# Patient Record
Sex: FEMALE | Race: WHITE | NOT HISPANIC OR LATINO | Employment: FULL TIME | URBAN - METROPOLITAN AREA
[De-identification: names, ages, dates, MRNs, and addresses within clinical notes are randomized per-mention and may not be internally consistent; named-entity substitution may affect disease eponyms.]

---

## 2018-06-04 ENCOUNTER — HISTORICAL (OUTPATIENT)
Dept: RADIOLOGY | Facility: HOSPITAL | Age: 33
End: 2018-06-04

## 2018-06-26 ENCOUNTER — HISTORICAL (OUTPATIENT)
Dept: RADIOLOGY | Facility: HOSPITAL | Age: 33
End: 2018-06-26

## 2018-09-18 ENCOUNTER — HISTORICAL (OUTPATIENT)
Dept: CARDIOLOGY | Facility: HOSPITAL | Age: 33
End: 2018-09-18

## 2018-12-28 ENCOUNTER — HISTORICAL (OUTPATIENT)
Dept: RADIOLOGY | Facility: HOSPITAL | Age: 33
End: 2018-12-28

## 2019-02-06 ENCOUNTER — HISTORICAL (OUTPATIENT)
Dept: LAB | Facility: HOSPITAL | Age: 34
End: 2019-02-06

## 2019-04-15 ENCOUNTER — HISTORICAL (OUTPATIENT)
Dept: LAB | Facility: HOSPITAL | Age: 34
End: 2019-04-15

## 2019-04-15 ENCOUNTER — HISTORICAL (OUTPATIENT)
Dept: ADMINISTRATIVE | Facility: HOSPITAL | Age: 34
End: 2019-04-15

## 2019-04-15 LAB
ABS NEUT (OLG): 2.3 X10(3)/MCL (ref 2.1–9.2)
ALBUMIN SERPL-MCNC: 4.5 GM/DL (ref 3.4–5)
ALBUMIN/GLOB SERPL: 2.14 {RATIO} (ref 1.5–2.5)
ALP SERPL-CCNC: 22 UNIT/L (ref 38–126)
ALT SERPL-CCNC: 9 UNIT/L (ref 7–52)
AST SERPL-CCNC: 8 UNIT/L (ref 15–37)
BILIRUB SERPL-MCNC: 0.6 MG/DL (ref 0.2–1)
BILIRUBIN DIRECT+TOT PNL SERPL-MCNC: 0.1 MG/DL (ref 0–0.5)
BILIRUBIN DIRECT+TOT PNL SERPL-MCNC: 0.5 MG/DL
BUN SERPL-MCNC: 14 MG/DL (ref 7–18)
CALCIUM SERPL-MCNC: 8.9 MG/DL (ref 8.5–10)
CHLORIDE SERPL-SCNC: 107 MMOL/L (ref 98–107)
CO2 SERPL-SCNC: 30 MMOL/L (ref 21–32)
CREAT SERPL-MCNC: 0.53 MG/DL (ref 0.6–1.3)
DEPRECATED CALCIDIOL+CALCIFEROL SERPL-MC: 19.5 NG/ML (ref 30–80)
ERYTHROCYTE [DISTWIDTH] IN BLOOD BY AUTOMATED COUNT: 12 % (ref 11.5–17)
ERYTHROCYTE [SEDIMENTATION RATE] IN BLOOD: 2 MM/HR (ref 0–20)
GLOBULIN SER-MCNC: 2.1 GM/DL (ref 1.2–3)
GLUCOSE SERPL-MCNC: 99 MG/DL (ref 74–106)
HCT VFR BLD AUTO: 32.7 % (ref 37–47)
HGB BLD-MCNC: 11.2 GM/DL (ref 12–16)
LYMPHOCYTES # BLD AUTO: 1.9 X10(3)/MCL (ref 0.6–3.4)
LYMPHOCYTES NFR BLD AUTO: 41.9 % (ref 13–40)
MCH RBC QN AUTO: 31.2 PG (ref 27–31.2)
MCHC RBC AUTO-ENTMCNC: 34 GM/DL (ref 32–36)
MCV RBC AUTO: 91 FL (ref 80–94)
MONOCYTES # BLD AUTO: 0.3 X10(3)/MCL (ref 0.1–1.3)
MONOCYTES NFR BLD AUTO: 7.2 % (ref 0.1–24)
NEUTROPHILS NFR BLD AUTO: 50.9 % (ref 47–80)
PLATELET # BLD AUTO: 170 X10(3)/MCL (ref 130–400)
PMV BLD AUTO: 10.7 FL (ref 9.4–12.4)
POTASSIUM SERPL-SCNC: 4.3 MMOL/L (ref 3.5–5.1)
PROT SERPL-MCNC: 6.6 GM/DL (ref 6.4–8.2)
RBC # BLD AUTO: 3.59 X10(6)/MCL (ref 4.2–5.4)
RHEUMATOID FACT SERPL-ACNC: <10 IU/ML (ref 0–15)
SODIUM SERPL-SCNC: 140 MMOL/L (ref 136–145)
WBC # SPEC AUTO: 4.5 X10(3)/MCL (ref 4.5–11.5)

## 2022-02-01 ENCOUNTER — HISTORICAL (OUTPATIENT)
Dept: ADMINISTRATIVE | Facility: HOSPITAL | Age: 37
End: 2022-02-01

## 2022-02-01 LAB — IRON SERPL-MCNC: 128 UG/DL (ref 50–170)

## 2022-04-07 ENCOUNTER — HISTORICAL (OUTPATIENT)
Dept: ADMINISTRATIVE | Facility: HOSPITAL | Age: 37
End: 2022-04-07

## 2022-04-24 VITALS
DIASTOLIC BLOOD PRESSURE: 62 MMHG | HEIGHT: 60 IN | BODY MASS INDEX: 18.4 KG/M2 | WEIGHT: 93.69 LBS | SYSTOLIC BLOOD PRESSURE: 106 MMHG

## 2022-05-01 NOTE — HISTORICAL [facility] CERNER
This is a historical note converted from Chandana. Formatting and pictures may have been removed.  Please reference Chandana for original formatting and attached multimedia. Chief Complaint  RECHECK NOT GETTING BETTER  History of Present Illness  Patient presents today for follow-up.? She has been struggling with dysphagia and weight loss?for several months now.? She seems to be?living with this condition and have adjusted?the consistency of her foods and her overall diet?significantly since I been working with her over the last 2 months.? Her lowest weight was 85 pounds and she is now?increased to 87.96 pounds?over the last 2 months.? She states that it is easier to eat during the day?so she has been eating breakfast every morning usually 2 eggs to packet cigarettes and possibly a shake, at lunch?she has been eating?ground turkey?or beef along with?a protein shake. ?Late in the evenings she seems to have her abdominal pain and difficulty swallowing worse than it is during the day.? Medications have not been helpful?including proton pump inhibitors, Levsin, and antianxiety medications.  During her last visit we did stated if it continued?to have difficulty?that possibly would do a CT of her abdomen?and possible neurology consult. ?She has not significantly improved she is sure she is ready for this further workup.  Looking back into her old medical records she also has?anemia and decreased vitamin D likely?due to decreased p.o. intake, since that has improved recently we will repeat this laboratory analysis.  Patient has been worked up by GI,?ENT, and primary care in the past and have not found a cause?for patients condition.? She and I have been trying different medications?and, working her way through this symptomatically?with her major focus on the fact that she should not lose any more weight and actually began to gain weight?which I am happy to report is improving slowly.  Review of Systems  Constitutional:?No  fever, no weakness, no weight loss, no fatigue  Eye: ? ? ? ? ? ? ? ???No eye redness, drainage, or pain,?she does report some dry eyes and often uses over-the-counter drops  ENMT:??? ? ? ? ? ? No sore?throat pain, postnasal drainage, ?or mucus production,?just spasming?especially when she tries to swallow, this is relatively unchanged.  Respiratory:????No wheezing,?no shortness of breath  Cardiovascular:??No chest pain, no SHAW  Gastrointestinal:?No nausea, vomiting, or diarrhea.? Patient reports it is hard to explain but she does have?abdominal pain off and on again worse in the evenings patient points to midepigastric?and lower?abdominal area, no hematochezia or melena  Musculoskeletal:?Patient states she is not sure if this is related but she is beginning to have?multiple areas of joint pain particularly in her knees after sitting for some period of time is very difficult to stand and causing increased pain, no joint swelling  Integumentary:??? No skin rash or abnormal lesion  Neuro:??No headaches, dizziness, or weakness  Psych:?Patient states that she is anxious over this condition?but is not having episodes?of debilitating?anxiety, or?depression, no SI/HI  Endo:??No polyuria, polydipsia, or polyphagia  Heme/Lymph:??No bruising or lymphadenopathy  Physical Exam  Vitals & Measurements  T:?37.3? ?C (Oral)? HR:?80(Peripheral)? BP:?102/68?  HT:?152?cm? WT:?39.9?kg? BMI:?17.27?  General: ???? ? ? ? ? Well developed, well-nourished, in no acute distress  Eye: ? ? ? ? ? ? ? ? ? ??Clear conjunctiva, eyelids normal  HENT:??? ? ? ? ? ? ? ? No erythema, No exudate or swelling, TMs clear  Neck:??? ? ? ? ? ? ? ? ?Full range of motion, No cervical lymphadenopathy  Respiratory:??? ? ?Clear to auscultation bilaterally  Cardiovascular:?Regular rate and rhythm without murmurs, gallops or rubs  Abdomen: ? ? ? ? ?Soft, NT, ND, NABS x4, no HSM  Musculoskeletal:?No CCE  Neuro: ? ? ? ? ? ? ? ? ?No motor/sensory deficits  Integumentary:  ??No rashes or skin lesions present  LN:?WNL  Assessment/Plan  1.?Dysphagia  ?Minimal improvement during the day, as bad as ever in the evenings  Patient adjusting p.o. intake?and showing some signs of weight gain as per above.  At her last office visit we discussed if no significant improvement but a CT scan of her abdomen will be completed see below as it is been ordered.? We also discussed possible neurological condition?and referral to neurology, we will proceed with this plan pending CT results.  Further discussion patient states that she had not had an autoimmune workup in the past?will complete laboratory analysis as per below.  Ordered:  Clinic Follow up, *Est. 05/15/19 3:00:00 CDT, Order for future visit, Dysphagia  Vitamin D deficiency  Weight loss  Abdominal pain  Anemia, [facility]  Office/Outpatient Visit Level 4 Established [id number] PC, Dysphagia  Vitamin D deficiency  Weight loss  Abdominal pain  Anemia, [facility] - [facility], 04/15/19 10:54:00 CDT  ?  2.?Vitamin D deficiency  Patient consistently took?supplementation for approximately a month but has not taken any since?she has much improved?her overall diet.  Ordered:  Clinic Follow up, *Est. 05/15/19 3:00:00 CDT, Order for future visit, Dysphagia  Vitamin D deficiency  Weight loss  Abdominal pain  Anemia, [facility]  Office/Outpatient Visit Level 4 Established [id number] PC, Dysphagia  Vitamin D deficiency  Weight loss  Abdominal pain  Anemia, [facility] - [facility], 04/15/19 10:54:00 CDT  Vitamin D, 25-Hydroxy Level, Routine collect, 04/15/19 10:41:00 CDT, Blood, Stop date 04/15/19 10:41:00 CDT, Lab Collect, Vitamin D deficiency, 04/15/19 10:41:00 CDT  ?  3.?Weight loss  Her weight?appears to have stabilized and we have been gaining weight over the last 2 months. ?Patient to continue to stay on her current diet?and hopefully this trend will continue.  Ordered:  Antinuclear Antibodies by [facility] [id number], Routine collect, 04/15/19 10:45:00 CDT, Blood,  Order for future visit, Stop date 04/15/19 10:45:00 CDT, Lab Collect, Multiple joint pain  Weight loss  Abdominal pain, 04/15/19 10:45:00 CDT  Clinic Follow up, *Est. 05/15/19 3:00:00 CDT, Order for future visit, Dysphagia  Vitamin D deficiency  Weight loss  Abdominal pain  Anemia, [facility]  Comprehensive Metabolic Panel, Routine collect, 04/15/19 10:41:00 CDT, Blood, Stop date 04/15/19 10:41:00 CDT, Lab Collect, Weight loss  Abdominal pain  Anemia, 04/15/19 10:41:00 CDT  Office/Outpatient Visit Level 4 Established [id number] PC, Dysphagia  Vitamin D deficiency  Weight loss  Abdominal pain  Anemia, [facility] - [facility], 04/15/19 10:54:00 CDT  Schedule Diagnostics Study, CT abdomen, yes Oral Contrast Requested, any, 04/15/19 10:33:00 CDT, Abdominal pain  Weight loss  Anemia  Sedimentation Rate, Routine collect, 04/15/19 10:45:00 CDT, Blood, Order for future visit, Stop date 04/15/19 10:45:00 CDT, Lab Collect, Weight loss  Abdominal pain  Multiple joint pain, 04/15/19 10:45:00 CDT  ?  4.?Abdominal pain  This?pain has persisted?and has been the constant in her overall symptoms,?despite being scoped in the past that was negative?I feel at this time since we have not had significant resolution of symptoms and pain is persisted that is time to do a CT scan with and without contrast.  Ordered:  Antinuclear Antibodies by [facility] [id number], Routine collect, 04/15/19 10:45:00 CDT, Blood, Order for future visit, Stop date 04/15/19 10:45:00 CDT, Lab Collect, Multiple joint pain  Weight loss  Abdominal pain, 04/15/19 10:45:00 CDT  Clinic Follow up, *Est. 05/15/19 3:00:00 CDT, Order for future visit, Dysphagia  Vitamin D deficiency  Weight loss  Abdominal pain  Anemia, [facility]  Comprehensive Metabolic Panel, Routine collect, 04/15/19 10:41:00 CDT, Blood, Stop date 04/15/19 10:41:00 CDT, Lab Collect, Weight loss  Abdominal pain  Anemia, 04/15/19 10:41:00 CDT  Office/Outpatient Visit Level 4 Established [id number] ,  Dysphagia  Vitamin D deficiency  Weight loss  Abdominal pain  Anemia, [facility] - [facility], 04/15/19 10:54:00 CDT  Schedule Diagnostics Study, CT abdomen, yes Oral Contrast Requested, any, 04/15/19 10:33:00 CDT, Abdominal pain  Weight loss  Anemia  Sedimentation Rate, Routine collect, 04/15/19 10:45:00 CDT, Blood, Order for future visit, Stop date 04/15/19 10:45:00 CDT, Lab Collect, Weight loss  Abdominal pain  Multiple joint pain, 04/15/19 10:45:00 CDT  ?  5.?Anemia  Repeat CBC today.  Ordered:  Automated Diff, Routine collect, 04/15/19 10:41:00 CDT, Blood, Collected, Stop date 04/15/19 10:41:00 CDT, Lab Collect, Anemia, 04/15/19 10:41:00 CDT  CBC w/ Auto Diff, Routine collect, 04/15/19 10:41:00 CDT, Blood, Stop date 04/15/19 10:41:00 CDT, Lab Collect, Anemia, 04/15/19 10:41:00 CDT  Clinic Follow up, *Est. 05/15/19 3:00:00 CDT, Order for future visit, Dysphagia  Vitamin D deficiency  Weight loss  Abdominal pain  Anemia, [facility]  Comprehensive Metabolic Panel, Routine collect, 04/15/19 10:41:00 CDT, Blood, Stop date 04/15/19 10:41:00 CDT, Lab Collect, Weight loss  Abdominal pain  Anemia, 04/15/19 10:41:00 CDT  Office/Outpatient Visit Level 4 Established [id number] PC, Dysphagia  Vitamin D deficiency  Weight loss  Abdominal pain  Anemia, [facility] - [facility], 04/15/19 10:54:00 CDT  Schedule Diagnostics Study, CT abdomen, yes Oral Contrast Requested, any, 04/15/19 10:33:00 CDT, Abdominal pain  Weight loss  Anemia  ?  Orders:  Rheumatoid Factor Quantitative, Routine collect, 04/15/19 10:45:00 CDT, Blood, Order for future visit, Stop date 04/15/19 10:45:00 CDT, Lab Collect, Multiple joint pain, 04/15/19 10:45:00 CDT   Problem List/Past Medical History  Ongoing  Abdominal pain  Anemia  Anxiety  Dysphagia  Vitamin D deficiency  Weight loss  Historical  No qualifying data  Procedure/Surgical History  Tonsillectomy and adenoidectomy   Medications  FAMCICLOVIR 250 MG TABLET, 250 mg= 1 tab(s), Oral, BID  Levsin 0.125  mg oral tablet, 0.125 mg= 1 tab(s), Oral, BID, 1 refills  Valium 5 mg oral tablet, 5 mg= 1 tab(s), Oral, QID, PRN, 1 refills  Allergies  BuSpar?(NUMBNESS)  Social History  Alcohol  Current, 1-2 times per year, 02/06/2019  Employment/School  Employed, 02/06/2019  Home/Environment  Lives with Children., 02/06/2019  Nutrition/Health  Regular, 02/06/2019  Substance Abuse  Never, 05/26/2018  Tobacco  Former smoker, quit more than 30 days ago, N/A, 04/15/2019  Former smoker, quit more than 30 days ago, N/A, 10 per day. 15 year(s)., 02/06/2019  Family History  Family history is negative  Health Maintenance  Health Maintenance  ???Pending?(in the next year)  ??? ??Due?  ??? ? ? ?ADL Screening due??04/15/19??and every 1??year(s)  ??? ? ? ?Alcohol Misuse Screening due??04/15/19??and every 1??year(s)  ??? ? ? ?Tetanus Vaccine due??04/15/19??and every 10??year(s)  ??? ??Due In Future?  ??? ? ? ?Blood Pressure Screening not due until??04/14/20??and every 1??year(s)  ??? ? ? ?Body Mass Index Check not due until??04/14/20??and every 1??year(s)  ??? ? ? ?Depression Screening not due until??04/14/20??and every 1??year(s)  ???Satisfied?(in the past 1 year)  ??? ??Satisfied?  ??? ? ? ?Blood Pressure Screening on??04/15/19.??Satisfied by Latha Madison LPN  ??? ? ? ?Body Mass Index Check on??04/15/19.??Satisfied by Latha Madison LPN  ??? ? ? ?Cervical Cancer Screening on??01/11/19.??Satisfied by Ekta Gupta  ??? ? ? ?Depression Screening on??04/15/19.??Satisfied by Latha Madison LPN  ??? ? ? ?Influenza Vaccine on??03/18/19.??Satisfied by Latha Madison LPN  ??? ? ? ?Obesity Screening on??04/15/19.??Satisfied by Latha Madison LPN  ?  ?      Under HPI?during voice and dictation for some reason at all corrected as packets of cigarettes this is incorrect patient does not smoke.

## 2022-05-18 PROBLEM — R13.10 DYSPHAGIA: Status: ACTIVE | Noted: 2022-05-18

## 2022-05-18 PROBLEM — E55.9 VITAMIN D DEFICIENCY: Status: ACTIVE | Noted: 2022-05-18

## 2022-05-18 PROBLEM — D64.9 ANEMIA: Status: ACTIVE | Noted: 2022-05-18

## 2022-05-18 PROBLEM — B35.9 DERMATOPHYTOSIS: Status: ACTIVE | Noted: 2022-05-18

## 2022-05-18 PROBLEM — K21.9 GASTROESOPHAGEAL REFLUX DISEASE: Status: ACTIVE | Noted: 2022-05-18

## 2022-05-18 PROBLEM — F41.1 GENERALIZED ANXIETY DISORDER: Status: ACTIVE | Noted: 2022-05-18

## 2022-05-18 PROBLEM — B00.9 HERPES SIMPLEX TYPE 1 INFECTION: Status: ACTIVE | Noted: 2022-05-18

## 2023-02-03 PROBLEM — Z00.00 WELLNESS EXAMINATION: Status: ACTIVE | Noted: 2023-02-03

## 2023-05-08 PROBLEM — Z00.00 WELLNESS EXAMINATION: Status: RESOLVED | Noted: 2023-02-03 | Resolved: 2023-05-08
